# Patient Record
(demographics unavailable — no encounter records)

---

## 2025-03-28 NOTE — HISTORY OF PRESENT ILLNESS
[FreeTextEntry1] : Patient complaining of irregular bleeding She is spotting before her period and then it continues after She had a work up in 2023 with a negative sonogram and biopsy  She was put on OCP which she stopped secondary to side effects

## 2025-03-28 NOTE — PLAN
[FreeTextEntry1] : Prior sonogram was negative Offer repeat sonogram which patient declines at this time Explained since there probably is not a structural issue for the bleeding that she most likely is having irregular bleeding secondary to hormonal changes Offer OCP or Mirena IUD  Patient to think about everything